# Patient Record
Sex: FEMALE | Race: WHITE | URBAN - METROPOLITAN AREA
[De-identification: names, ages, dates, MRNs, and addresses within clinical notes are randomized per-mention and may not be internally consistent; named-entity substitution may affect disease eponyms.]

---

## 2019-05-12 ENCOUNTER — OFFICE VISIT (OUTPATIENT)
Dept: FAMILY MEDICINE CLINIC | Age: 64
End: 2019-05-12
Payer: COMMERCIAL

## 2019-05-12 VITALS
RESPIRATION RATE: 18 BRPM | HEART RATE: 78 BPM | OXYGEN SATURATION: 98 % | HEIGHT: 65 IN | TEMPERATURE: 98.9 F | DIASTOLIC BLOOD PRESSURE: 86 MMHG | WEIGHT: 287 LBS | SYSTOLIC BLOOD PRESSURE: 138 MMHG | BODY MASS INDEX: 47.82 KG/M2

## 2019-05-12 DIAGNOSIS — J40 BRONCHITIS: Primary | ICD-10-CM

## 2019-05-12 PROCEDURE — 99214 OFFICE O/P EST MOD 30 MIN: CPT | Performed by: NURSE PRACTITIONER

## 2019-05-12 RX ORDER — PREDNISONE 20 MG/1
40 TABLET ORAL DAILY
Qty: 10 TABLET | Refills: 0 | Status: SHIPPED | OUTPATIENT
Start: 2019-05-12 | End: 2019-05-17

## 2019-05-12 RX ORDER — BENZONATATE 200 MG/1
200 CAPSULE ORAL 3 TIMES DAILY PRN
Qty: 20 CAPSULE | Refills: 0 | Status: SHIPPED | OUTPATIENT
Start: 2019-05-12 | End: 2019-05-19

## 2019-05-12 RX ORDER — MULTIVITAMIN WITH IRON
TABLET ORAL
COMMUNITY

## 2019-05-12 ASSESSMENT — ENCOUNTER SYMPTOMS
CHEST TIGHTNESS: 0
EYE DISCHARGE: 0
SHORTNESS OF BREATH: 0
WHEEZING: 1
SORE THROAT: 1
EYE REDNESS: 0
VOICE CHANGE: 0
SINUS PRESSURE: 0
COUGH: 1

## 2019-05-12 NOTE — PATIENT INSTRUCTIONS
Patient Education        Bronchitis: Care Instructions  Your Care Instructions    Bronchitis is inflammation of the bronchial tubes, which carry air to the lungs. The tubes swell and produce mucus, or phlegm. The mucus and inflamed bronchial tubes make you cough. You may have trouble breathing. Most cases of bronchitis are caused by viruses like those that cause colds. Antibiotics usually do not help and they may be harmful. Bronchitis usually develops rapidly and lasts about 2 to 3 weeks in otherwise healthy people. Follow-up care is a key part of your treatment and safety. Be sure to make and go to all appointments, and call your doctor if you are having problems. It's also a good idea to know your test results and keep a list of the medicines you take. How can you care for yourself at home? · Take all medicines exactly as prescribed. Call your doctor if you think you are having a problem with your medicine. · Get some extra rest.  · Take an over-the-counter pain medicine, such as acetaminophen (Tylenol), ibuprofen (Advil, Motrin), or naproxen (Aleve) to reduce fever and relieve body aches. Read and follow all instructions on the label. · Do not take two or more pain medicines at the same time unless the doctor told you to. Many pain medicines have acetaminophen, which is Tylenol. Too much acetaminophen (Tylenol) can be harmful. · Take an over-the-counter cough medicine that contains dextromethorphan to help quiet a dry, hacking cough so that you can sleep. Avoid cough medicines that have more than one active ingredient. Read and follow all instructions on the label. · Breathe moist air from a humidifier, hot shower, or sink filled with hot water. The heat and moisture will thin mucus so you can cough it out. · Do not smoke. Smoking can make bronchitis worse. If you need help quitting, talk to your doctor about stop-smoking programs and medicines.  These can increase your chances of quitting for good.  When should you call for help? Call 911 anytime you think you may need emergency care. For example, call if:    · You have severe trouble breathing.    Call your doctor now or seek immediate medical care if:    · You have new or worse trouble breathing.     · You cough up dark brown or bloody mucus (sputum).     · You have a new or higher fever.     · You have a new rash.    Watch closely for changes in your health, and be sure to contact your doctor if:    · You cough more deeply or more often, especially if you notice more mucus or a change in the color of your mucus.     · You are not getting better as expected. Where can you learn more? Go to https://[employer]. org and sign in to your [employer] account. Enter H333 in the [employer] box to learn more about \"Bronchitis: Care Instructions. \"     If you do not have an account, please click on the \"Sign Up Now\" link. Current as of: September 5, 2018  Content Version: 12.0  © 6283-7575 Healthwise, Inc[employer]. Care instructions adapted under license by [facility] ([facility]). If you have questions about a medical condition or this instruction, always ask your healthcare professional. [place] [address] any warranty or liability for your use of this information.

## 2019-05-12 NOTE — PROGRESS NOTES
[address] [place] [address]  Dept: [phone]  Dept Fax: [phone]     Rich Randle is a 61 y.o. female who presents to the urgent care today for her medicalconditions/complaints as noted below. Rich Randle is c/o of Cough (started 5 days ago and worsening  ); Chest Congestion; and Fatigue    HPI:      Cough   This is a new problem. Episode onset: 3 days ago. The problem has been gradually worsening. The cough is productive of sputum. Associated symptoms include headaches, postnasal drip, a sore throat and wheezing. Pertinent negatives include no chest pain, chills, ear pain, eye redness, fever, myalgias, rash or shortness of breath. Treatments tried: cough medication OTC. The treatment provided no relief. There is no history of asthma or COPD. Past Medical History:   Diagnosis Date    Breast mass in female     dysplasia noted on biopsy in 2014, high risk, started on tamoxifen    Cardiac murmur     GERD (gastroesophageal reflux disease)     GERD    OAB (overactive bladder)     Rosacea       Current Outpatient Medications   Medication Sig Dispense Refill    OMEGA-3 FATTY ACIDS PO Take 1 capsule by mouth      predniSONE (DELTASONE) 20 MG tablet Take 2 tablets by mouth daily for 5 days 10 tablet 0    benzonatate (TESSALON) 200 MG capsule Take 1 capsule by mouth 3 times daily as needed for Cough 20 capsule 0    magnesium (MAGNESIUM-OXIDE) 250 MG TABS tablet once daily.       NAPROXEN PO Take by mouth      Multiple Vitamins-Minerals (MULTIVITAMIN ADULT PO) Take by mouth      esomeprazole (NEXIUM) 20 MG delayed release capsule Take 1 capsule by mouth      fluticasone (CUTIVATE) 0.05 % cream Apply topically      Mirabegron ER (MYRBETRIQ) 25 MG TB24 Take 1 tablet by mouth      doxycycline (ORACEA) 40 MG delayed release capsule Take 1 capsule by mouth      tamoxifen (NOLVADEX) 20 MG tablet Take 1 tablet by mouth No current facility-administered medications for this visit. Allergies   Allergen Reactions    Latex Hives     Authoring Clinician or Source System: Mallory Haff M    Caffeine Other (See Comments)     Makes patient nervous and has migraines - per patient  Authoring Clinician or Source System: Flores Faustin Quinolones Hives     Authoring Clinician or Source System: Shelbie Escamilla    Adhesive Tape Rash and Swelling     Reviewed PMH, SH, and FH with the patient and updated. Subjective:      Review of Systems   Constitutional: Positive for fatigue. Negative for chills and fever. HENT: Positive for congestion (mild), postnasal drip and sore throat. Negative for ear discharge, ear pain, sinus pressure, sneezing and voice change. Eyes: Negative for discharge and redness. Respiratory: Positive for cough and wheezing. Negative for chest tightness and shortness of breath. Cardiovascular: Negative. Negative for chest pain. Musculoskeletal: Negative for myalgias. Skin: Negative for rash. Neurological: Positive for light-headedness (due to coughing) and headaches. Negative for dizziness and weakness. Hematological: Negative for adenopathy. All other systems reviewed and are negative. Objective:      Physical Exam   Constitutional: She appears well-developed and well-nourished. No distress. HENT:   Head: Normocephalic. Right Ear: Tympanic membrane and external ear normal.   Left Ear: Tympanic membrane and external ear normal.   Nose: Nose normal. Right sinus exhibits no maxillary sinus tenderness and no frontal sinus tenderness. Left sinus exhibits no maxillary sinus tenderness and no frontal sinus tenderness. Mouth/Throat: Oropharynx is clear and moist. No oropharyngeal exudate or posterior oropharyngeal erythema. Eyes: Right eye exhibits no discharge. Left eye exhibits no discharge. Cardiovascular: Normal rate, regular rhythm and normal heart sounds.    No murmur heard. Pulmonary/Chest: Effort normal and breath sounds normal. No respiratory distress. She has no wheezes. She has no rales. Lymphadenopathy:     She has no cervical adenopathy. Skin: Skin is warm. No rash noted. She is not diaphoretic. Nursing note and vitals reviewed. /86 (Site: Left Lower Arm, Position: Sitting, Cuff Size: Medium Adult)   Pulse 78   Temp 98.9 °F (37.2 °C) (Tympanic)   Resp 18   Ht 5' 5\" (1.651 m)   Wt 287 lb (130.2 kg)   SpO2 98%   BMI 47.76 kg/m²     Assessment:       Diagnosis Orders   1. Bronchitis  predniSONE (DELTASONE) 20 MG tablet    benzonatate (TESSALON) 200 MG capsule     Plan:      I believe that this is likely a viral illness based on the physical exam findings. Prednisone sent to the pharmacy to help enable symptom relief. Tessalon as needed for the cough. Tylenol for fever/discomfort. Patient agreeable to treatment plan. Educational materials provided on AVS.  Follow up if symptoms do not improve/worsen. Orders Placed This Encounter   Medications    predniSONE (DELTASONE) 20 MG tablet     Sig: Take 2 tablets by mouth daily for 5 days     Dispense:  10 tablet     Refill:  0    benzonatate (TESSALON) 200 MG capsule     Sig: Take 1 capsule by mouth 3 times daily as needed for Cough     Dispense:  20 capsule     Refill:  0        Patient given educational materials - see patient instructions. Discussed use, benefit, and side effects of prescribed medications. All patientquestions answered. Pt voiced understanding.     Electronically signed by NEIL Fitzgerald CNP on 5/12/2019at 2:23 PM